# Patient Record
(demographics unavailable — no encounter records)

---

## 2025-06-02 NOTE — HISTORY OF PRESENT ILLNESS
[FreeTextEntry1] : Ivone Angeles is a 83 yo female with PMH HTN, hiatal hernia repair, uterovaginal prolapse, frequent UTIs, GERD here for initial consultation of pulsatile tinnitus x 2 months triggered by laying down and relieved with sitting up. At times can feel head pressure as well when laying down.  Describes tinnitus as heart beating which can be triggered by head turning side to side while laying down and can alternate from left to right ear. CT Head and CTA Head complete no evidence of aneurysm or major stenosis, she does have some calcified plaque in bilateral carotid without associated stenosis.  ESR/ CRP WNL. MRI brain with no acute pathology, ischemic changes noted. She states this past year has been challenging medically for her.  After her hiatal hernia she developed sciatica received steroid injections which triggered shingles. She was recently seen in ER x 1 week ago for right flank pain and back pain diagnoses with pyelonephritis.  Reports since finishing the antibiotics pulsatile tinnitus has improved for the past 2 nights. She has been experiencing nausea which makes it hard for her to eat resulting in weight loss for the past few months.  Following with GI specialist and plans to meet with nutritionist.  Working on increasing water intake. BP elevated in office today, does not routinely monitor at home.  Follows with cardiologist Dr Flores. Denies hearing loss, jaw pain, vision disturbances such as diplopia, vision loss or eye pain.  Denies scalp tenderness, headaches, neck pain, back pain. Recent eye exam complete  - - unsure of provider name.

## 2025-06-02 NOTE — DATA REVIEWED
[de-identified] : 5/23/25-MRI Brain- FINDINGS:   BRAIN PARENCHYMA:   Mild to moderate atrophy. No current evidence of diffusion restriction to   suggest acute ischemia.   Mild chronic pontine ischemic changes. Mild chronic periventricular and   subcortical white matter microvascular ischemic changes.   No parenchymal mass or mass effect. No parenchymal hematoma.   Corpus callosum well formed. No cerebellar tonsillar ectopia.     VENTRICLES:   There is no hydrocephalus.   There is no midline shift.     EXTRA-AXIAL:   No extra-axial mass.  No evidence of a subdural or epidural collection.   Patent basal cisterns.     OTHER:   Dental artifact is present.   No air-fluid levels within the paranasal sinuses.   Mucosal thickening right greater than left mastoids.   Prior left-sided lens replacement     IMPRESSION:   No evidence of a mass or current evidence of acute ischemia.   Atrophy with chronic white matter ischemic changes as described..   [de-identified] : 4/3/25- CT Head, CTA Neck- FINDINGS: There is no acute intracranial hemorrhage, mass effect, shift of the midline structures, herniation, extra-axial fluid collection, or hydrocephalus.  There is mild diffuse cerebral volume loss with prominence of the sulci, fissures, and cisternal spaces which is normal for the patient's age. There is minimal deep and periventricular white matter hypoattenuation statistically compatible with microvascular changes given calcific atherosclerotic disease of the intracranial arteries.  The paranasal sinuses and tympanomastoid cavities are clear. The calvarium is intact. There is evidence of left-sided cataract removal. The right orbit appears unremarkable.  CTA HEAD: Calcified plaques affect the bilateral carotid siphons without associated stenosis. 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 Otherwise, the bilateral intracranial internal carotid, anterior, and middle cerebral arteries appear unremarkable.  The anterior and bilateral posterior communicating arteries are seen.  A the posterior circulation appears intact.  No aneurysms or arteriovenous malformations are seen.  The intracranial venous structures are patent.   IMPRESSION:  HEAD CT: No acute intracranial hemorrhage, mass effect, or shift of the midline structures.  CTA HEAD: No large vessel occlusion or major stenosis.   4/3/25 ESR 8 CRP <3.0

## 2025-06-02 NOTE — DATA REVIEWED
[de-identified] : 5/23/25-MRI Brain- FINDINGS:   BRAIN PARENCHYMA:   Mild to moderate atrophy. No current evidence of diffusion restriction to   suggest acute ischemia.   Mild chronic pontine ischemic changes. Mild chronic periventricular and   subcortical white matter microvascular ischemic changes.   No parenchymal mass or mass effect. No parenchymal hematoma.   Corpus callosum well formed. No cerebellar tonsillar ectopia.     VENTRICLES:   There is no hydrocephalus.   There is no midline shift.     EXTRA-AXIAL:   No extra-axial mass.  No evidence of a subdural or epidural collection.   Patent basal cisterns.     OTHER:   Dental artifact is present.   No air-fluid levels within the paranasal sinuses.   Mucosal thickening right greater than left mastoids.   Prior left-sided lens replacement     IMPRESSION:   No evidence of a mass or current evidence of acute ischemia.   Atrophy with chronic white matter ischemic changes as described..   [de-identified] : 4/3/25- CT Head, CTA Neck- FINDINGS: There is no acute intracranial hemorrhage, mass effect, shift of the midline structures, herniation, extra-axial fluid collection, or hydrocephalus.  There is mild diffuse cerebral volume loss with prominence of the sulci, fissures, and cisternal spaces which is normal for the patient's age. There is minimal deep and periventricular white matter hypoattenuation statistically compatible with microvascular changes given calcific atherosclerotic disease of the intracranial arteries.  The paranasal sinuses and tympanomastoid cavities are clear. The calvarium is intact. There is evidence of left-sided cataract removal. The right orbit appears unremarkable.  CTA HEAD: Calcified plaques affect the bilateral carotid siphons without associated stenosis. 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 Otherwise, the bilateral intracranial internal carotid, anterior, and middle cerebral arteries appear unremarkable.  The anterior and bilateral posterior communicating arteries are seen.  A the posterior circulation appears intact.  No aneurysms or arteriovenous malformations are seen.  The intracranial venous structures are patent.   IMPRESSION:  HEAD CT: No acute intracranial hemorrhage, mass effect, or shift of the midline structures.  CTA HEAD: No large vessel occlusion or major stenosis.   4/3/25 ESR 8 CRP <3.0

## 2025-06-02 NOTE — ASSESSMENT
[FreeTextEntry1] : ESR, CRP WNL - will request recent lab work from PCP for thyroid function CT Head and CTA head no evidence of aneurysm or major stenosis. No imaging of neck or venous system. Will order MRV head / neck and MRI with IAC w/wo contrast to further evaluate. Recommended to see ENT for evaluation as well  MRI findings of ischemic changes - I recommended to start ASA 81mg daily for stroke prevention. Pt is dealing with complications from hpylori repair following with GI and cardiology will speak with them prior to initiating  BP slightly elevated in office today, goal <130/80 will request recent cholesterol panel and A1C   Follow up after testing complete

## 2025-06-02 NOTE — DATA REVIEWED
[de-identified] : 5/23/25-MRI Brain- FINDINGS:   BRAIN PARENCHYMA:   Mild to moderate atrophy. No current evidence of diffusion restriction to   suggest acute ischemia.   Mild chronic pontine ischemic changes. Mild chronic periventricular and   subcortical white matter microvascular ischemic changes.   No parenchymal mass or mass effect. No parenchymal hematoma.   Corpus callosum well formed. No cerebellar tonsillar ectopia.     VENTRICLES:   There is no hydrocephalus.   There is no midline shift.     EXTRA-AXIAL:   No extra-axial mass.  No evidence of a subdural or epidural collection.   Patent basal cisterns.     OTHER:   Dental artifact is present.   No air-fluid levels within the paranasal sinuses.   Mucosal thickening right greater than left mastoids.   Prior left-sided lens replacement     IMPRESSION:   No evidence of a mass or current evidence of acute ischemia.   Atrophy with chronic white matter ischemic changes as described..   [de-identified] : 4/3/25- CT Head, CTA Neck- FINDINGS: There is no acute intracranial hemorrhage, mass effect, shift of the midline structures, herniation, extra-axial fluid collection, or hydrocephalus.  There is mild diffuse cerebral volume loss with prominence of the sulci, fissures, and cisternal spaces which is normal for the patient's age. There is minimal deep and periventricular white matter hypoattenuation statistically compatible with microvascular changes given calcific atherosclerotic disease of the intracranial arteries.  The paranasal sinuses and tympanomastoid cavities are clear. The calvarium is intact. There is evidence of left-sided cataract removal. The right orbit appears unremarkable.  CTA HEAD: Calcified plaques affect the bilateral carotid siphons without associated stenosis. 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 Otherwise, the bilateral intracranial internal carotid, anterior, and middle cerebral arteries appear unremarkable.  The anterior and bilateral posterior communicating arteries are seen.  A the posterior circulation appears intact.  No aneurysms or arteriovenous malformations are seen.  The intracranial venous structures are patent.   IMPRESSION:  HEAD CT: No acute intracranial hemorrhage, mass effect, or shift of the midline structures.  CTA HEAD: No large vessel occlusion or major stenosis.   4/3/25 ESR 8 CRP <3.0

## 2025-06-02 NOTE — HISTORY OF PRESENT ILLNESS
[FreeTextEntry1] : Ivone Angeles is a 81 yo female with PMH HTN, hiatal hernia repair, uterovaginal prolapse, frequent UTIs, GERD here for initial consultation of pulsatile tinnitus x 2 months triggered by laying down and relieved with sitting up. At times can feel head pressure as well when laying down.  Describes tinnitus as heart beating which can be triggered by head turning side to side while laying down and can alternate from left to right ear. CT Head and CTA Head complete no evidence of aneurysm or major stenosis, she does have some calcified plaque in bilateral carotid without associated stenosis.  ESR/ CRP WNL. MRI brain with no acute pathology, ischemic changes noted. She states this past year has been challenging medically for her.  After her hiatal hernia she developed sciatica received steroid injections which triggered shingles. She was recently seen in ER x 1 week ago for right flank pain and back pain diagnoses with pyelonephritis.  Reports since finishing the antibiotics pulsatile tinnitus has improved for the past 2 nights. She has been experiencing nausea which makes it hard for her to eat resulting in weight loss for the past few months.  Following with GI specialist and plans to meet with nutritionist.  Working on increasing water intake. BP elevated in office today, does not routinely monitor at home.  Follows with cardiologist Dr Flores. Denies hearing loss, jaw pain, vision disturbances such as diplopia, vision loss or eye pain.  Denies scalp tenderness, headaches, neck pain, back pain. Recent eye exam complete  - - unsure of provider name.

## 2025-06-02 NOTE — PHYSICAL EXAM
[FreeTextEntry1] : General: No acute distress, Awake, Alert.     Mental status    Awake, alert, and oriented to person, time and place, Normal attention span and concentration, Recent and remote memory intact, Language intact, Fund of knowledge intact.    Delayed recall 3/3     Cranial Nerves    II: VFF    III, IV, VI: PERRL, EOMI.   V: Facial sensation is normal B/L.    VII: Facial strength is normal B/L.    VIII: Gross hearing is intact.      IX, X: Palate is midline and elevates symmetrically.    XI: Trapezius normal strength.    XII: Tongue midline without atrophy or fasciculations.        Motor exam    Muscle tone - no evidence of rigidity or resistance in all 4 extremities.    No atrophy or fasciculations    Muscle Strength: arms and legs, proximal and distal flexors and extensors are normal        No UE drift.       Reflexes    All present, normal, and symmetrical.      Plantars right: mute.    Plantars left: mute.        Coordination    Finger to nose: Normal.    Heel to shin: Normal.       Sensory    Intact sensation to PP, vibration and cold.     Gait    Normal including heels, toes, and tandem gait.